# Patient Record
Sex: FEMALE | URBAN - METROPOLITAN AREA
[De-identification: names, ages, dates, MRNs, and addresses within clinical notes are randomized per-mention and may not be internally consistent; named-entity substitution may affect disease eponyms.]

---

## 2023-09-11 ENCOUNTER — OFFICE VISIT (OUTPATIENT)
Dept: UROGYNECOLOGY | Age: 43
End: 2023-09-11
Payer: COMMERCIAL

## 2023-09-11 VITALS
DIASTOLIC BLOOD PRESSURE: 83 MMHG | SYSTOLIC BLOOD PRESSURE: 160 MMHG | RESPIRATION RATE: 16 BRPM | TEMPERATURE: 97.5 F | OXYGEN SATURATION: 99 % | HEART RATE: 71 BPM

## 2023-09-11 DIAGNOSIS — N39.41 URGE INCONTINENCE: ICD-10-CM

## 2023-09-11 DIAGNOSIS — R39.15 URINARY URGENCY: Primary | ICD-10-CM

## 2023-09-11 LAB
BILIRUBIN, POC: NORMAL
BLOOD URINE, POC: NORMAL
CLARITY, POC: CLEAR
COLOR, POC: YELLOW
EMPTY COUGH STRESS TEST: NORMAL
FIRST SENSATION: 140 CC
FULL COUGH STRESS TEST: NORMAL
GLUCOSE URINE, POC: NORMAL
KETONES, POC: NORMAL
LEUKOCYTE EST, POC: NORMAL
MAX SENSATION: 350 CC
NITRATE, URINE POC: NORMAL
NITRITE, POC: NORMAL
PH, POC: 7
POST VOID RESIDUAL (PVR): 40 ML
PROTEIN, POC: NORMAL
RBC URINE, POC: NORMAL
SECOND SENSATION: 275 CC
SPASM: NORMAL
SPECIFIC GRAVITY, POC: 1.01
UROBILINOGEN, POC: NORMAL
WBC URINE, POC: NORMAL

## 2023-09-11 PROCEDURE — 81002 URINALYSIS NONAUTO W/O SCOPE: CPT | Performed by: NURSE PRACTITIONER

## 2023-09-11 PROCEDURE — 51725 SIMPLE CYSTOMETROGRAM: CPT | Performed by: NURSE PRACTITIONER

## 2023-09-11 PROCEDURE — 1036F TOBACCO NON-USER: CPT | Performed by: NURSE PRACTITIONER

## 2023-09-11 PROCEDURE — 81025 URINE PREGNANCY TEST: CPT | Performed by: NURSE PRACTITIONER

## 2023-09-11 PROCEDURE — 99204 OFFICE O/P NEW MOD 45 MIN: CPT | Performed by: NURSE PRACTITIONER

## 2023-09-11 PROCEDURE — G8427 DOCREV CUR MEDS BY ELIG CLIN: HCPCS | Performed by: NURSE PRACTITIONER

## 2023-09-11 PROCEDURE — G8421 BMI NOT CALCULATED: HCPCS | Performed by: NURSE PRACTITIONER

## 2023-09-11 RX ORDER — METOPROLOL TARTRATE 50 MG/1
50 TABLET, FILM COATED ORAL DAILY
COMMUNITY

## 2023-09-11 RX ORDER — TRAMADOL HYDROCHLORIDE 50 MG/1
50 TABLET ORAL
COMMUNITY
Start: 2022-08-12

## 2023-09-11 ASSESSMENT — ENCOUNTER SYMPTOMS
CONSTIPATION: 1
TROUBLE SWALLOWING: 1
EYE ITCHING: 1
BACK PAIN: 1

## 2023-09-11 NOTE — PROGRESS NOTES
9/11/2023      HPI:     Name: Ronnell Mcburney  YOB: 1980    CC: Patient is a 39 y.o. female who is seen in consultation from [address] [place] [address] for evaluation of  urinary urgency and frequency . HPI:   Patient presents for evaluation of urinary urgency and frequency    Of note she was seen by [facility] one month ago for same condition  At that time was placed on Trospium BID. She reports she did not take the medication as there was no clear reason or explanation of her diagnosis. She presents today for second opinion on her urinary symptoms of urgency with occasional incontinence  Denies leakage with cough/sneeze  She also presents with multiple GYN concerns including abnormal menses, recent CT scan showing Pelvic Congestion and bilateral ovarian cysts  Was recently in ER for abdominal bloating and has f/u appointment with GI.      6/2023 CT AP IV contrast:  1. 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 This can be seen with pelvic congestive syndrome. 2. Bilateral ovarian cysts   5/2023 renal u/s: WNL  4/2023 CT AP w/o contrast  Bladder control problem: No   Bladder emptying problems:  Yes   How long have you had bladder emptying problems? 6 months  Do you notice any dribbling of urine when you stand after passing urine? No  Do you usually have difficulty starting your urine stream? Yes  Do you have to assume abnormal positions to urinate? No   Do you have to strain to empty your bladder? Yes  Do you feel as if your bladder is empty after passing urine? No  Is your urine flow: intermittent   Prolapse/Vaginal Support problems: No   Bowel problem(s): Yes   How long have you had bowel symptoms? 6 months  Do you have accidental loss of solid stool? No  Do you have accidental loss of liquid stool? No  Do you have accidental loss of gas? No  How many episodes per week? 5  Do you have constipation? Yes Do you have diarrhea?  No Problems with

## 2024-08-04 ENCOUNTER — OFFICE VISIT (OUTPATIENT)
Age: 44
End: 2024-08-04

## 2024-08-04 VITALS
SYSTOLIC BLOOD PRESSURE: 136 MMHG | HEIGHT: 63 IN | DIASTOLIC BLOOD PRESSURE: 79 MMHG | HEART RATE: 79 BPM | OXYGEN SATURATION: 99 % | TEMPERATURE: 97.9 F | WEIGHT: 192.8 LBS | BODY MASS INDEX: 34.16 KG/M2

## 2024-08-04 DIAGNOSIS — B86 SCABIES: ICD-10-CM

## 2024-08-04 DIAGNOSIS — L23.7 POISON IVY DERMATITIS: Primary | ICD-10-CM

## 2024-08-04 RX ORDER — TRIAMCINOLONE ACETONIDE 1 MG/G
OINTMENT TOPICAL 2 TIMES DAILY
Qty: 30 G | Refills: 0 | Status: SHIPPED | OUTPATIENT
Start: 2024-08-04 | End: 2024-08-11

## 2024-08-04 RX ORDER — PERMETHRIN 50 MG/G
CREAM TOPICAL
Qty: 1 EACH | Refills: 0 | Status: SHIPPED | OUTPATIENT
Start: 2024-08-04

## 2024-08-04 RX ORDER — METHYLPREDNISOLONE 4 MG/1
TABLET ORAL
Qty: 21 TABLET | Refills: 0 | Status: SHIPPED | OUTPATIENT
Start: 2024-08-04 | End: 2024-08-10